# Patient Record
(demographics unavailable — no encounter records)

---

## 2025-01-09 NOTE — HISTORY OF PRESENT ILLNESS
[FreeTextEntry1] : she wants to swithc BC  has gained 13 lbs so far  desires OCP while she gets the nexplanon

## 2025-07-08 NOTE — HISTORY OF PRESENT ILLNESS
[FreeTextEntry1] : here for annual has pelvic pain no menses since delivering  does not want copper IUD  PT neg